# Patient Record
Sex: FEMALE | Race: OTHER | HISPANIC OR LATINO | URBAN - METROPOLITAN AREA
[De-identification: names, ages, dates, MRNs, and addresses within clinical notes are randomized per-mention and may not be internally consistent; named-entity substitution may affect disease eponyms.]

---

## 2017-05-25 ENCOUNTER — OUTPATIENT (OUTPATIENT)
Dept: OUTPATIENT SERVICES | Facility: HOSPITAL | Age: 27
LOS: 1 days | Discharge: HOME | End: 2017-05-25

## 2017-06-28 DIAGNOSIS — Z02.9 ENCOUNTER FOR ADMINISTRATIVE EXAMINATIONS, UNSPECIFIED: ICD-10-CM

## 2019-05-14 PROBLEM — Z00.00 ENCOUNTER FOR PREVENTIVE HEALTH EXAMINATION: Status: ACTIVE | Noted: 2019-05-14

## 2019-06-06 ENCOUNTER — RECORD ABSTRACTING (OUTPATIENT)
Age: 29
End: 2019-06-06

## 2019-06-06 DIAGNOSIS — Z78.9 OTHER SPECIFIED HEALTH STATUS: ICD-10-CM

## 2019-07-08 ENCOUNTER — APPOINTMENT (OUTPATIENT)
Dept: NEUROLOGY | Facility: CLINIC | Age: 29
End: 2019-07-08
Payer: COMMERCIAL

## 2019-07-08 VITALS
DIASTOLIC BLOOD PRESSURE: 69 MMHG | SYSTOLIC BLOOD PRESSURE: 108 MMHG | TEMPERATURE: 97.5 F | HEART RATE: 74 BPM | HEIGHT: 61 IN | OXYGEN SATURATION: 100 % | BODY MASS INDEX: 26.43 KG/M2 | WEIGHT: 140 LBS

## 2019-07-08 DIAGNOSIS — Z87.898 PERSONAL HISTORY OF OTHER SPECIFIED CONDITIONS: ICD-10-CM

## 2019-07-08 PROCEDURE — 99214 OFFICE O/P EST MOD 30 MIN: CPT

## 2019-07-08 NOTE — PHYSICAL EXAM
[FreeTextEntry1] : She is in a very good mood follows first step commands crosses the midline well her attention and concentration is normal and mental status is normal.\par She does have a tendency to tilt her head to the right as she is seeking. When in standing she also notes more weight on the right.\par Otherwise her exam including cranial nerves motor and sensory and cerebellar exam are normal

## 2019-07-08 NOTE — HISTORY OF PRESENT ILLNESS
[FreeTextEntry1] : Kayla is here for follow up. 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 She is following up with her primary physician she describes her mood and his sleep to be very good and she is also doing very well at her job.

## 2019-07-08 NOTE — ASSESSMENT
[FreeTextEntry1] : Kayla is here for follow up.\par She carries the diagnosis of primary headache doing very well on the current doses of prophylaxis and also abortive treatment she had to stop taking her medication because she wants to get pregnant. I have told her that propranolol could be safe during the pregnancy however the decision has been and is to not take any prophylaxis we will continue with the current level and I will see her in 6 months she will call me if there are any shortness in between

## 2020-01-13 ENCOUNTER — APPOINTMENT (OUTPATIENT)
Dept: NEUROLOGY | Facility: CLINIC | Age: 30
End: 2020-01-13

## 2021-02-24 ENCOUNTER — APPOINTMENT (OUTPATIENT)
Dept: NEUROLOGY | Facility: CLINIC | Age: 31
End: 2021-02-24
Payer: MEDICAID

## 2021-02-24 PROCEDURE — 99213 OFFICE O/P EST LOW 20 MIN: CPT | Mod: 95

## 2021-02-24 NOTE — HISTORY OF PRESENT ILLNESS
[Home] : at home, [unfilled] , at the time of the visit. [Medical Office: ([facility])___] : at the medical office located in  [Verbal consent obtained from patient] : the patient, [unfilled] [FreeTextEntry1] : Kayla  wanted this F/U as  a telehealth visit. This is because of having frequent HA and also Low back pain.\par The frequency of the HA are as many as 15/month.she has nausea and photophobia with it . The Ha are throbbing in nature.\par she says even in couple of occasions she had difficulty staying at work which is uncharacteristic of her.\par She says she takes pain medication  Naproxin 2 tabs a day for the HA and also mainly LBP.\par the pain in the back is occasionally so bad that she cannot stand on her feet\par The pain at times radiates to the left leg.No report of weakness . No Bladder issues\par No falls.still not pregnant but trying.\par No other issues\par \par Plan\par will do MRI\par F/u in the office for repeating PE\par Decide on prophylaxis ? ( propranolol)

## 2021-03-12 ENCOUNTER — OUTPATIENT (OUTPATIENT)
Dept: OUTPATIENT SERVICES | Facility: HOSPITAL | Age: 31
LOS: 1 days | Discharge: HOME | End: 2021-03-12
Payer: MEDICAID

## 2021-03-12 ENCOUNTER — RESULT REVIEW (OUTPATIENT)
Age: 31
End: 2021-03-12

## 2021-03-12 DIAGNOSIS — M54.5 LOW BACK PAIN: ICD-10-CM

## 2021-03-12 PROCEDURE — 72148 MRI LUMBAR SPINE W/O DYE: CPT | Mod: 26

## 2021-03-24 ENCOUNTER — APPOINTMENT (OUTPATIENT)
Dept: NEUROLOGY | Facility: CLINIC | Age: 31
End: 2021-03-24
Payer: MEDICAID

## 2021-03-24 VITALS
HEART RATE: 82 BPM | WEIGHT: 145 LBS | BODY MASS INDEX: 27.38 KG/M2 | HEIGHT: 61 IN | DIASTOLIC BLOOD PRESSURE: 60 MMHG | TEMPERATURE: 96.7 F | OXYGEN SATURATION: 98 % | SYSTOLIC BLOOD PRESSURE: 110 MMHG

## 2021-03-24 PROCEDURE — 99072 ADDL SUPL MATRL&STAF TM PHE: CPT

## 2021-03-24 PROCEDURE — 99214 OFFICE O/P EST MOD 30 MIN: CPT

## 2021-03-25 ENCOUNTER — RX CHANGE (OUTPATIENT)
Age: 31
End: 2021-03-25

## 2021-03-25 RX ORDER — PROPRANOLOL HYDROCHLORIDE 10 MG/1
10 TABLET ORAL
Qty: 30 | Refills: 3 | Status: DISCONTINUED | COMMUNITY
Start: 2021-03-25 | End: 2021-03-25

## 2021-03-25 NOTE — PHYSICAL EXAM
[FreeTextEntry1] : A/A/Ox3\par follows 4 step commands\par good attention\par good mood\par slightly tilts to the left when sitting and dropping the left shoulder as standing\par slight increase muscle bulk right\par no drift\par 5/5 strength\par normal tone\par able to walk on tip toes and the heels\par stable gait

## 2021-03-25 NOTE — ASSESSMENT
[FreeTextEntry1] : 1- primary HA doing well\par 2-LBP  + L 4-5 radiculopathy\par \par Plan\par GBP  100 bid\par Robaxin\par PT and EMG\par discussed extensively the ergonomics, weight, posture

## 2021-03-25 NOTE — HISTORY OF PRESENT ILLNESS
[FreeTextEntry1] : Kasey is here for the F/U.\par She is not complaing of HA but holding her back on the right side because of the pain.\par She says pain is really bothering her since last few days . Perhaps exacerbated by sitting down and mopping the floor.\par she says the pain at times radiates down to the legs and mainly left .The distribution of the ache is back of the calf, lateral aspect of the thigh.\par No clear weakness\par No falls\par No change in the bladder and bowel function\par MRI was done \par the results were discussed \par it shows  L4-5 disc disease and foraminal narrowing

## 2021-06-16 ENCOUNTER — APPOINTMENT (OUTPATIENT)
Dept: NEUROLOGY | Facility: CLINIC | Age: 31
End: 2021-06-16

## 2021-07-27 ENCOUNTER — APPOINTMENT (OUTPATIENT)
Dept: NEUROLOGY | Facility: CLINIC | Age: 31
End: 2021-07-27

## 2022-01-24 ENCOUNTER — RX RENEWAL (OUTPATIENT)
Age: 32
End: 2022-01-24

## 2022-06-20 ENCOUNTER — APPOINTMENT (OUTPATIENT)
Dept: OBGYN | Facility: CLINIC | Age: 32
End: 2022-06-20

## 2022-08-08 ENCOUNTER — APPOINTMENT (OUTPATIENT)
Dept: OBGYN | Facility: CLINIC | Age: 32
End: 2022-08-08

## 2022-08-08 ENCOUNTER — TRANSCRIPTION ENCOUNTER (OUTPATIENT)
Age: 32
End: 2022-08-08

## 2022-08-08 ENCOUNTER — NON-APPOINTMENT (OUTPATIENT)
Age: 32
End: 2022-08-08

## 2022-08-08 VITALS
DIASTOLIC BLOOD PRESSURE: 69 MMHG | HEIGHT: 61 IN | HEART RATE: 54 BPM | BODY MASS INDEX: 26.62 KG/M2 | SYSTOLIC BLOOD PRESSURE: 109 MMHG | WEIGHT: 141 LBS

## 2022-08-08 DIAGNOSIS — N83.292 OTHER OVARIAN CYST, LEFT SIDE: ICD-10-CM

## 2022-08-08 PROCEDURE — 99203 OFFICE O/P NEW LOW 30 MIN: CPT | Mod: 25

## 2022-08-08 PROCEDURE — 76830 TRANSVAGINAL US NON-OB: CPT

## 2022-08-08 NOTE — DISCUSSION/SUMMARY
[FreeTextEntry1] : reviewed blood work , no indication of PCOS\par follow up in jan 2023 for WWV\par pt doesn’t need OCP renewal at this time, has enough to last her until jan

## 2022-08-08 NOTE — PHYSICAL EXAM
[Chaperone Present] : A chaperone was present in the examining room during all aspects of the physical examination [Appropriately responsive] : appropriately responsive [Alert] : alert [No Acute Distress] : no acute distress [Oriented x3] : oriented x3 [Labia Majora] : normal [Labia Minora] : normal [Scant] : There was scant vaginal bleeding [Normal] : normal [Uterine Adnexae] : normal

## 2022-08-08 NOTE — HISTORY OF PRESENT ILLNESS
[Oral Contraceptive] : uses oral contraception pills [Y] : Patient is sexually active [Monogamous (Male Partner)] : is monogamous with a male partner [N] : Patient denies prior pregnancies [LMPDate] : 8/2/22 [MensesFreq] : 28 [MensesLength] : 5 [MensesAmount] : mod [Regular Cycle Intervals] : periods have been regular [FreeTextEntry1] : 8/2/22 [Currently Active] : currently active [No] : No

## 2022-08-08 NOTE — PROCEDURE
[Suspected Ovarian Cyst] : suspected ovarian cyst [F/U Abnormal US] : f/u abnormal ultrasound [Transvaginal Ultrasound] : transvaginal ultrasound [Anteverted] : anteverted [FreeTextEntry5] : 39.09cc [FreeTextEntry7] : 6.31cc [FreeTextEntry8] : 6.06cc [FreeTextEntry4] : no evidence of PCOS

## 2022-11-21 ENCOUNTER — NON-APPOINTMENT (OUTPATIENT)
Age: 32
End: 2022-11-21

## 2022-12-27 ENCOUNTER — APPOINTMENT (OUTPATIENT)
Dept: NEUROLOGY | Facility: CLINIC | Age: 32
End: 2022-12-27

## 2022-12-27 VITALS
TEMPERATURE: 97.9 F | WEIGHT: 140 LBS | HEART RATE: 69 BPM | BODY MASS INDEX: 26.43 KG/M2 | SYSTOLIC BLOOD PRESSURE: 118 MMHG | OXYGEN SATURATION: 98 % | HEIGHT: 61 IN | DIASTOLIC BLOOD PRESSURE: 67 MMHG

## 2022-12-27 PROCEDURE — 99214 OFFICE O/P EST MOD 30 MIN: CPT

## 2022-12-27 RX ORDER — GABAPENTIN 100 MG/1
100 CAPSULE ORAL
Qty: 60 | Refills: 2 | Status: DISCONTINUED | COMMUNITY
Start: 2021-03-24 | End: 2022-12-27

## 2022-12-27 RX ORDER — METHOCARBAMOL 500 MG/1
500 TABLET, FILM COATED ORAL
Qty: 60 | Refills: 0 | Status: DISCONTINUED | COMMUNITY
Start: 2021-03-24 | End: 2022-12-27

## 2023-01-18 NOTE — HISTORY OF PRESENT ILLNESS
[FreeTextEntry1] : Kayla is here for the F/U. she appears to be slightly down \par She says , she is not sleeping well and is anxious and also depressed > according to her it started after she lost her father.\par Her PMD had prescribed her Xanax PRN and also Sertraline.\par She says she is also having about 10 HA/month. Not keeping her from going to work and often responding well to the Tx but the headache is atypical. 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

## 2023-01-18 NOTE — PHYSICAL EXAM
[FreeTextEntry1] : A/A / Ox 3\par follows 4 step commands slightly looking tired and flat and less of humor\par normal funduscopy\par normal CN\par no drift\par no dysmetria\par stable gait\par

## 2023-01-18 NOTE — ASSESSMENT
[FreeTextEntry1] : HA ( primary)\par LBP\par Mood D/O\par \par \par plan\par MRI of the brain due to atypical headache\par F/U

## 2023-01-24 ENCOUNTER — NON-APPOINTMENT (OUTPATIENT)
Age: 33
End: 2023-01-24

## 2023-01-25 ENCOUNTER — APPOINTMENT (OUTPATIENT)
Dept: NEUROLOGY | Facility: CLINIC | Age: 33
End: 2023-01-25
Payer: COMMERCIAL

## 2023-01-25 PROCEDURE — 95816 EEG AWAKE AND DROWSY: CPT

## 2023-02-17 ENCOUNTER — OUTPATIENT (OUTPATIENT)
Dept: OUTPATIENT SERVICES | Facility: HOSPITAL | Age: 33
LOS: 1 days | End: 2023-02-17
Payer: MEDICAID

## 2023-02-17 ENCOUNTER — RESULT REVIEW (OUTPATIENT)
Age: 33
End: 2023-02-17

## 2023-02-17 DIAGNOSIS — R51.9 HEADACHE, UNSPECIFIED: ICD-10-CM

## 2023-02-17 DIAGNOSIS — Z00.8 ENCOUNTER FOR OTHER GENERAL EXAMINATION: ICD-10-CM

## 2023-02-17 PROCEDURE — 70551 MRI BRAIN STEM W/O DYE: CPT

## 2023-02-17 PROCEDURE — 70551 MRI BRAIN STEM W/O DYE: CPT | Mod: 26

## 2023-02-18 DIAGNOSIS — R51.9 HEADACHE, UNSPECIFIED: ICD-10-CM

## 2023-03-12 ENCOUNTER — LABORATORY RESULT (OUTPATIENT)
Age: 33
End: 2023-03-12

## 2023-03-13 ENCOUNTER — APPOINTMENT (OUTPATIENT)
Dept: OBGYN | Facility: CLINIC | Age: 33
End: 2023-03-13
Payer: COMMERCIAL

## 2023-03-13 VITALS
SYSTOLIC BLOOD PRESSURE: 106 MMHG | WEIGHT: 145 LBS | DIASTOLIC BLOOD PRESSURE: 72 MMHG | BODY MASS INDEX: 27.38 KG/M2 | HEART RATE: 85 BPM | HEIGHT: 61 IN

## 2023-03-13 DIAGNOSIS — N92.6 IRREGULAR MENSTRUATION, UNSPECIFIED: ICD-10-CM

## 2023-03-13 DIAGNOSIS — Z30.41 ENCOUNTER FOR SURVEILLANCE OF CONTRACEPTIVE PILLS: ICD-10-CM

## 2023-03-13 DIAGNOSIS — Z01.411 ENCOUNTER FOR GYNECOLOGICAL EXAMINATION (GENERAL) (ROUTINE) WITH ABNORMAL FINDINGS: ICD-10-CM

## 2023-03-13 DIAGNOSIS — N89.8 OTHER SPECIFIED NONINFLAMMATORY DISORDERS OF VAGINA: ICD-10-CM

## 2023-03-13 PROCEDURE — 76830 TRANSVAGINAL US NON-OB: CPT

## 2023-03-13 PROCEDURE — 99395 PREV VISIT EST AGE 18-39: CPT | Mod: 25

## 2023-03-13 NOTE — DISCUSSION/SUMMARY
[FreeTextEntry1] : pt only had 1 episode of irregular bleeding while she had COVID\par she has been on this pill for more than 2 yrs without any adverse side effects\par recommended remaining  on current pill, if any bleeding irregularities occur then she  should call office.

## 2023-03-13 NOTE — HISTORY OF PRESENT ILLNESS
[FreeTextEntry1] : annual vivit\par renewal of COCs, taking pill for pregnancy prevention and cyst suppression\par had irregular cycle, pt states she bled the entire month, has hx of ovarian cysts\par 1st episode like this, had COVID at the time [Oral Contraceptive] : uses oral contraception pills [Y] : Patient is sexually active [Monogamous (Male Partner)] : is monogamous with a male partner [N] : Patient denies prior pregnancies [LMPDate] : 8/2/22 [MensesFreq] : 28 [MensesLength] : 5 [MensesAmount] : mod [Regular Cycle Intervals] : periods have been regular [Currently Active] : currently active [No] : No

## 2023-03-13 NOTE — PHYSICAL EXAM
[Chaperone Present] : A chaperone was present in the examining room during all aspects of the physical examination [Appropriately responsive] : appropriately responsive [Alert] : alert [No Acute Distress] : no acute distress [Soft] : soft [Non-tender] : non-tender [Non-distended] : non-distended [Oriented x3] : oriented x3 [Examination Of The Breasts] : a normal appearance [No Masses] : no breast masses were palpable [Labia Majora] : normal [Labia Minora] : normal [Discharge] : a  ~M vaginal discharge was present [Heavy] : heavy [White] : white [Frothy] : frothy [Normal] : normal [Uterine Adnexae] : normal

## 2023-03-15 LAB — CYTOLOGY CVX/VAG DOC THIN PREP: NORMAL

## 2023-03-16 ENCOUNTER — NON-APPOINTMENT (OUTPATIENT)
Age: 33
End: 2023-03-16

## 2023-03-16 DIAGNOSIS — B96.89 ACUTE VAGINITIS: ICD-10-CM

## 2023-03-16 DIAGNOSIS — N76.0 ACUTE VAGINITIS: ICD-10-CM

## 2023-03-16 LAB
A VAGINAE DNA VAG QL NAA+PROBE: ABNORMAL
BVAB2 DNA VAG QL NAA+PROBE: ABNORMAL
C KRUSEI DNA VAG QL NAA+PROBE: NEGATIVE
C TRACH RRNA SPEC QL NAA+PROBE: NEGATIVE
HPV HIGH+LOW RISK DNA PNL CVX: DETECTED
MEGA1 DNA VAG QL NAA+PROBE: ABNORMAL
N GONORRHOEA RRNA SPEC QL NAA+PROBE: NEGATIVE
T VAGINALIS RRNA SPEC QL NAA+PROBE: NEGATIVE

## 2023-04-13 ENCOUNTER — NON-APPOINTMENT (OUTPATIENT)
Age: 33
End: 2023-04-13

## 2023-04-17 ENCOUNTER — RX RENEWAL (OUTPATIENT)
Age: 33
End: 2023-04-17

## 2023-04-17 RX ORDER — RIZATRIPTAN BENZOATE 5 MG/1
5 TABLET ORAL
Qty: 30 | Refills: 2 | Status: ACTIVE | COMMUNITY
Start: 1900-01-01 | End: 1900-01-01

## 2023-05-26 ENCOUNTER — RX CHANGE (OUTPATIENT)
Age: 33
End: 2023-05-26

## 2023-05-26 RX ORDER — TOPIRAMATE 25 MG/1
25 TABLET, FILM COATED ORAL AT BEDTIME
Qty: 90 | Refills: 1 | Status: ACTIVE | COMMUNITY
Start: 2023-04-27 | End: 1900-01-01

## 2023-06-26 ENCOUNTER — APPOINTMENT (OUTPATIENT)
Dept: NEUROLOGY | Facility: CLINIC | Age: 33
End: 2023-06-26

## 2023-10-09 ENCOUNTER — TRANSCRIPTION ENCOUNTER (OUTPATIENT)
Age: 33
End: 2023-10-09

## 2023-10-10 ENCOUNTER — NON-APPOINTMENT (OUTPATIENT)
Age: 33
End: 2023-10-10

## 2024-01-22 ENCOUNTER — TRANSCRIPTION ENCOUNTER (OUTPATIENT)
Age: 34
End: 2024-01-22

## 2024-03-18 ENCOUNTER — APPOINTMENT (OUTPATIENT)
Dept: OBGYN | Facility: CLINIC | Age: 34
End: 2024-03-18

## 2024-03-25 ENCOUNTER — APPOINTMENT (OUTPATIENT)
Dept: NEUROLOGY | Facility: CLINIC | Age: 34
End: 2024-03-25
Payer: COMMERCIAL

## 2024-03-25 VITALS — DIASTOLIC BLOOD PRESSURE: 63 MMHG | SYSTOLIC BLOOD PRESSURE: 115 MMHG | HEART RATE: 65 BPM

## 2024-03-25 VITALS
SYSTOLIC BLOOD PRESSURE: 117 MMHG | TEMPERATURE: 98.5 F | OXYGEN SATURATION: 100 % | HEIGHT: 61 IN | WEIGHT: 144 LBS | BODY MASS INDEX: 27.19 KG/M2 | DIASTOLIC BLOOD PRESSURE: 75 MMHG | HEART RATE: 65 BPM

## 2024-03-25 VITALS — HEART RATE: 63 BPM | SYSTOLIC BLOOD PRESSURE: 109 MMHG | DIASTOLIC BLOOD PRESSURE: 66 MMHG

## 2024-03-25 VITALS — HEART RATE: 70 BPM | DIASTOLIC BLOOD PRESSURE: 65 MMHG | SYSTOLIC BLOOD PRESSURE: 116 MMHG

## 2024-03-25 DIAGNOSIS — R51.9 HEADACHE, UNSPECIFIED: ICD-10-CM

## 2024-03-25 DIAGNOSIS — M54.50 LOW BACK PAIN, UNSPECIFIED: ICD-10-CM

## 2024-03-25 PROCEDURE — 99213 OFFICE O/P EST LOW 20 MIN: CPT

## 2024-03-25 RX ORDER — PROPRANOLOL HYDROCHLORIDE 10 MG/1
10 TABLET ORAL
Qty: 15 | Refills: 4 | Status: ACTIVE | COMMUNITY
Start: 2021-03-25 | End: 1900-01-01

## 2024-03-26 NOTE — HISTORY OF PRESENT ILLNESS
[FreeTextEntry1] : Kayla is 33 year old female with PMH of back pain and headache presented for a follow up. She was last seen in 2022. She continues to have headache but stopped all medication because she is now 4 month pregnant. Has only been taking Tylenol prn(typically about 2 times per week). Pregnancy is going well she is under the care of Dr Birch. 2/17/23 she had an MRI of the brain which was unremarkable 1/25/23 REEG- normal

## 2024-03-26 NOTE — DISCUSSION/SUMMARY
[FreeTextEntry1] : Kayla is 33 year old female with PMH of back pain and headache presented for a follow up. Discussed at length medication that are safe to take during pregnancy. Pt agreed to try Propranolol 5 mg daily. Also discussed importance of staying hydrated and daily stretching. Pt will call if she develops any atypical headaches. Follow up in 4 month before delivery.  Case discussed with Dr. Kenneth Smiley, DNP, ACNP-BC

## 2024-03-26 NOTE — PHYSICAL EXAM
[General Appearance - In No Acute Distress] : in no acute distress [General Appearance - Alert] : alert [Impaired Insight] : insight and judgment were intact [Oriented To Time, Place, And Person] : oriented to person, place, and time [Person] : oriented to person [Place] : oriented to place [Time] : oriented to time [Cranial Nerves Optic (II)] : visual acuity intact bilaterally,  visual fields full to confrontation, pupils equal round and reactive to light [Cranial Nerves Oculomotor (III)] : extraocular motion intact [Cranial Nerves Trigeminal (V)] : facial sensation intact symmetrically [Cranial Nerves Vestibulocochlear (VIII)] : hearing was intact bilaterally [Cranial Nerves Facial (VII)] : face symmetrical [Cranial Nerves Accessory (XI - Cranial And Spinal)] : head turning and shoulder shrug symmetric [Cranial Nerves Glossopharyngeal (IX)] : tongue and palate midline [Cranial Nerves Hypoglossal (XII)] : there was no tongue deviation with protrusion [No Muscle Atrophy] : normal bulk in all four extremities [Paresis Pronator Drift Right-Sided] : no pronator drift on the right [Paresis Pronator Drift Left-Sided] : no pronator drift on the left [Motor Strength Upper Extremities Bilaterally] : strength was normal in both upper extremities [Limited Balance] : balance was intact [Motor Strength Lower Extremities Bilaterally] : strength was normal in both lower extremities [Past-pointing] : there was no past-pointing [Tremor] : no tremor present

## 2024-06-12 ENCOUNTER — EMERGENCY (EMERGENCY)
Facility: HOSPITAL | Age: 34
LOS: 0 days | Discharge: HOME | End: 2024-06-12
Attending: EMERGENCY MEDICINE
Payer: COMMERCIAL

## 2024-06-12 VITALS
SYSTOLIC BLOOD PRESSURE: 119 MMHG | HEIGHT: 61 IN | OXYGEN SATURATION: 99 % | HEART RATE: 77 BPM | WEIGHT: 160.06 LBS | RESPIRATION RATE: 17 BRPM | DIASTOLIC BLOOD PRESSURE: 66 MMHG

## 2024-06-12 VITALS
DIASTOLIC BLOOD PRESSURE: 58 MMHG | SYSTOLIC BLOOD PRESSURE: 103 MMHG | HEART RATE: 80 BPM | RESPIRATION RATE: 18 BRPM | OXYGEN SATURATION: 98 % | TEMPERATURE: 98 F

## 2024-06-12 DIAGNOSIS — R55 SYNCOPE AND COLLAPSE: ICD-10-CM

## 2024-06-12 DIAGNOSIS — Z3A.24 24 WEEKS GESTATION OF PREGNANCY: ICD-10-CM

## 2024-06-12 DIAGNOSIS — O99.891 OTHER SPECIFIED DISEASES AND CONDITIONS COMPLICATING PREGNANCY: ICD-10-CM

## 2024-06-12 LAB
ALBUMIN SERPL ELPH-MCNC: 4.1 G/DL — SIGNIFICANT CHANGE UP (ref 3.5–5.2)
ALP SERPL-CCNC: 49 U/L — SIGNIFICANT CHANGE UP (ref 30–115)
ALT FLD-CCNC: 20 U/L — SIGNIFICANT CHANGE UP (ref 0–41)
ANION GAP SERPL CALC-SCNC: 3 MMOL/L — LOW (ref 7–14)
APPEARANCE UR: ABNORMAL
AST SERPL-CCNC: 23 U/L — SIGNIFICANT CHANGE UP (ref 0–41)
BACTERIA # UR AUTO: ABNORMAL /HPF
BASOPHILS # BLD AUTO: 0.05 K/UL — SIGNIFICANT CHANGE UP (ref 0–0.2)
BASOPHILS NFR BLD AUTO: 0.4 % — SIGNIFICANT CHANGE UP (ref 0–1)
BILIRUB SERPL-MCNC: <0.2 MG/DL — SIGNIFICANT CHANGE UP (ref 0.2–1.2)
BILIRUB UR-MCNC: NEGATIVE — SIGNIFICANT CHANGE UP
BLD GP AB SCN SERPL QL: SIGNIFICANT CHANGE UP
BUN SERPL-MCNC: 7 MG/DL — LOW (ref 10–20)
CALCIUM SERPL-MCNC: 9.2 MG/DL — SIGNIFICANT CHANGE UP (ref 8.4–10.5)
CAST: 1 /LPF — SIGNIFICANT CHANGE UP (ref 0–4)
CHLORIDE SERPL-SCNC: 104 MMOL/L — SIGNIFICANT CHANGE UP (ref 98–110)
CO2 SERPL-SCNC: 20 MMOL/L — SIGNIFICANT CHANGE UP (ref 17–32)
COLOR SPEC: YELLOW — SIGNIFICANT CHANGE UP
CREAT SERPL-MCNC: <0.5 MG/DL — LOW (ref 0.7–1.5)
DIFF PNL FLD: NEGATIVE — SIGNIFICANT CHANGE UP
EGFR: 134 ML/MIN/1.73M2 — SIGNIFICANT CHANGE UP
EOSINOPHIL # BLD AUTO: 0.18 K/UL — SIGNIFICANT CHANGE UP (ref 0–0.7)
EOSINOPHIL NFR BLD AUTO: 1.5 % — SIGNIFICANT CHANGE UP (ref 0–8)
GLUCOSE SERPL-MCNC: 85 MG/DL — SIGNIFICANT CHANGE UP (ref 70–99)
GLUCOSE UR QL: NEGATIVE MG/DL — SIGNIFICANT CHANGE UP
HCG SERPL-ACNC: 3694 MIU/ML — HIGH
HCT VFR BLD CALC: 33 % — LOW (ref 37–47)
HGB BLD-MCNC: 11 G/DL — LOW (ref 12–16)
IMM GRANULOCYTES NFR BLD AUTO: 1.9 % — HIGH (ref 0.1–0.3)
KETONES UR-MCNC: NEGATIVE MG/DL — SIGNIFICANT CHANGE UP
LEUKOCYTE ESTERASE UR-ACNC: ABNORMAL
LIDOCAIN IGE QN: 16 U/L — SIGNIFICANT CHANGE UP (ref 7–60)
LYMPHOCYTES # BLD AUTO: 1.91 K/UL — SIGNIFICANT CHANGE UP (ref 1.2–3.4)
LYMPHOCYTES # BLD AUTO: 16.1 % — LOW (ref 20.5–51.1)
MCHC RBC-ENTMCNC: 31.8 PG — HIGH (ref 27–31)
MCHC RBC-ENTMCNC: 33.3 G/DL — SIGNIFICANT CHANGE UP (ref 32–37)
MCV RBC AUTO: 95.4 FL — SIGNIFICANT CHANGE UP (ref 81–99)
MONOCYTES # BLD AUTO: 1.37 K/UL — HIGH (ref 0.1–0.6)
MONOCYTES NFR BLD AUTO: 11.6 % — HIGH (ref 1.7–9.3)
NEUTROPHILS # BLD AUTO: 8.1 K/UL — HIGH (ref 1.4–6.5)
NEUTROPHILS NFR BLD AUTO: 68.5 % — SIGNIFICANT CHANGE UP (ref 42.2–75.2)
NITRITE UR-MCNC: NEGATIVE — SIGNIFICANT CHANGE UP
NRBC # BLD: 0 /100 WBCS — SIGNIFICANT CHANGE UP (ref 0–0)
PH UR: 7 — SIGNIFICANT CHANGE UP (ref 5–8)
PLATELET # BLD AUTO: 215 K/UL — SIGNIFICANT CHANGE UP (ref 130–400)
PMV BLD: 10.2 FL — SIGNIFICANT CHANGE UP (ref 7.4–10.4)
POTASSIUM SERPL-MCNC: 4.1 MMOL/L — SIGNIFICANT CHANGE UP (ref 3.5–5)
POTASSIUM SERPL-SCNC: 4.1 MMOL/L — SIGNIFICANT CHANGE UP (ref 3.5–5)
PROT SERPL-MCNC: 6.2 G/DL — SIGNIFICANT CHANGE UP (ref 6–8)
PROT UR-MCNC: SIGNIFICANT CHANGE UP MG/DL
RBC # BLD: 3.46 M/UL — LOW (ref 4.2–5.4)
RBC # FLD: 14 % — SIGNIFICANT CHANGE UP (ref 11.5–14.5)
RBC CASTS # UR COMP ASSIST: 9 /HPF — HIGH (ref 0–4)
SODIUM SERPL-SCNC: 127 MMOL/L — LOW (ref 135–146)
SP GR SPEC: 1.01 — SIGNIFICANT CHANGE UP (ref 1–1.03)
SQUAMOUS # UR AUTO: 32 /HPF — HIGH (ref 0–5)
UROBILINOGEN FLD QL: 0.2 MG/DL — SIGNIFICANT CHANGE UP (ref 0.2–1)
WBC # BLD: 11.84 K/UL — HIGH (ref 4.8–10.8)
WBC # FLD AUTO: 11.84 K/UL — HIGH (ref 4.8–10.8)
WBC UR QL: 10 /HPF — HIGH (ref 0–5)

## 2024-06-12 PROCEDURE — 86901 BLOOD TYPING SEROLOGIC RH(D): CPT

## 2024-06-12 PROCEDURE — 85025 COMPLETE CBC W/AUTO DIFF WBC: CPT

## 2024-06-12 PROCEDURE — 82962 GLUCOSE BLOOD TEST: CPT

## 2024-06-12 PROCEDURE — 36415 COLL VENOUS BLD VENIPUNCTURE: CPT

## 2024-06-12 PROCEDURE — 93308 TTE F-UP OR LMTD: CPT

## 2024-06-12 PROCEDURE — 86850 RBC ANTIBODY SCREEN: CPT

## 2024-06-12 PROCEDURE — 99284 EMERGENCY DEPT VISIT MOD MDM: CPT

## 2024-06-12 PROCEDURE — 76815 OB US LIMITED FETUS(S): CPT

## 2024-06-12 PROCEDURE — 86900 BLOOD TYPING SEROLOGIC ABO: CPT

## 2024-06-12 PROCEDURE — 93005 ELECTROCARDIOGRAM TRACING: CPT

## 2024-06-12 PROCEDURE — 93308 TTE F-UP OR LMTD: CPT | Mod: 26

## 2024-06-12 PROCEDURE — 99285 EMERGENCY DEPT VISIT HI MDM: CPT | Mod: 25

## 2024-06-12 PROCEDURE — 93010 ELECTROCARDIOGRAM REPORT: CPT | Mod: 77

## 2024-06-12 PROCEDURE — 93010 ELECTROCARDIOGRAM REPORT: CPT

## 2024-06-12 PROCEDURE — 76815 OB US LIMITED FETUS(S): CPT | Mod: 26

## 2024-06-12 PROCEDURE — 83690 ASSAY OF LIPASE: CPT

## 2024-06-12 PROCEDURE — 80053 COMPREHEN METABOLIC PANEL: CPT

## 2024-06-12 PROCEDURE — 81001 URINALYSIS AUTO W/SCOPE: CPT

## 2024-06-12 PROCEDURE — 84702 CHORIONIC GONADOTROPIN TEST: CPT

## 2024-06-12 RX ORDER — SODIUM CHLORIDE 9 MG/ML
1000 INJECTION INTRAMUSCULAR; INTRAVENOUS; SUBCUTANEOUS ONCE
Refills: 0 | Status: COMPLETED | OUTPATIENT
Start: 2024-06-12 | End: 2024-06-12

## 2024-06-12 RX ADMIN — SODIUM CHLORIDE 1000 MILLILITER(S): 9 INJECTION INTRAMUSCULAR; INTRAVENOUS; SUBCUTANEOUS at 11:10

## 2024-06-12 NOTE — ED PROVIDER NOTE - RHYTHM STRIP/ULTRASOUND IMAGES/CT SCAN IMAGES SHOWED
Independent interpretation of the US as a preliminary reading by Dr. Gab Klein shows Independent interpretation of the US as a preliminary reading by Dr. Gab Klein shows nml fhr of 144

## 2024-06-12 NOTE — ED PROVIDER NOTE - OBJECTIVE STATEMENT
34-year-old female G1, , 24 weeks pregnant, and with no past medical history who presents to the ED after syncope episode.  Reports that she was at work and fell dizzy and passed out for a few seconds.  Reports that her coworker was able to catch her, so she did not sustain any trauma.  Denies headache, chest pain, shortness of breath, nausea, vomiting, abdominal pain, vaginal bleeding/discharge, and hematuria/dysuria.

## 2024-06-12 NOTE — ED PROVIDER NOTE - PHYSICAL EXAMINATION
CONSTITUTIONAL: in no apparent distress.   HEAD: Normocephalic; atraumatic.   EYES: Pupils are round and reactive, extra-ocular muscles are intact. Eyelids are normal in appearance without swelling or lesions.   ENT: Hearing is intact with good acuity to spoken voice.  Patient is speaking clearly, not muffled and airway is intact.   RESPIRATORY: No signs of respiratory distress. Lung sounds are clear in all lobes bilaterally without rales, rhonchi, or wheezes.  CARDIOVASCULAR: Regular rate and rhythm.   GI: Abdomen is soft, non-tender, and without distention. Bowel sounds are present and normoactive in all four quadrants. No masses are noted.   BACK: No evidence of trauma or deformity. No CVA tenderness bilaterally. Normal ROM.   NEURO: A & O x 3. Normal speech. No focal deficit.  PSYCHOLOGICAL: Appropriate mood and affect. Good judgement and insight.

## 2024-06-12 NOTE — ED ADULT NURSE NOTE - NSFALLUNIVINTERV_ED_ALL_ED
Bed/Stretcher in lowest position, wheels locked, appropriate side rails in place/Call bell, personal items and telephone in reach/Instruct patient to call for assistance before getting out of bed/chair/stretcher/Non-slip footwear applied when patient is off stretcher/[place] to call system/Physically safe environment - no spills, clutter or unnecessary equipment/Purposeful proactive rounding/Room/bathroom lighting operational, light cord in reach

## 2024-06-12 NOTE — ED PROVIDER NOTE - NSFOLLOWUPINSTRUCTIONS_ED_ALL_ED_FT
Please make sure to follow up with your primary care doctor in 3 days.    Please make sure to follow up with your obstetrician in 3 days.      You were noted to have what is called, a syncopal episode, which is an event when you temporarily lose consciousness. These events happen for a variety of reasons and you were kept in the hospital to evaluate what the cause of your event was. Thankfully, these events in themselves do not leave any long lasting effects on your body, however, they may happen again if the cause of the problem is not found and treated if need be. Many people never find out what caused their event. If you have been advised to follow up with any doctors, or specialists, please be sure to do so.

## 2024-06-12 NOTE — ED PROVIDER NOTE - NSDCPRINTRESULTS_ED_ALL_ED
Patient requests all Lab, Cardiology, and Radiology Results on their Discharge Instructions
no pain, swelling or deformity of joints

## 2024-06-12 NOTE — ED PROVIDER NOTE - CLINICAL SUMMARY MEDICAL DECISION MAKING FREE TEXT BOX
evaluated for syncope, lab work was nml. bedside echo nml. fetal us with nml fhr. ua reviewed however patient has no symptoms and likely contaminated. Patient to be discharged from ED. Any available test results were discussed with patient and/or family. Verbal instructions given, including instructions to return to ED immediately for any new, worsening, or concerning symptoms. Patient endorsed understanding. Written discharge instructions additionally given, including follow-up plan.

## 2024-06-12 NOTE — ED PROVIDER NOTE - NS_EDPROVIDERDISPOUSERTYPE_ED_A_ED
Called patient and advised that provider is full for calendar year   Will call and charlie for 2024 when it is open   Ok for the  hub to read   Attending Attestation (For Attendings USE Only)...

## 2024-06-12 NOTE — ED ADULT NURSE NOTE - OBJECTIVE STATEMENT
34 yr old female, presenting to ED c/o syncope. Pt c/o syncopal episode at work. (+)LOC, denies (-)HT/AC use. Denies n/v/d/fevers/chills. Pt A&Ox4, ambulatory baseline.

## 2024-06-12 NOTE — ED PROVIDER NOTE - PATIENT PORTAL LINK FT
You can access the FollowMyHealth Patient Portal offered by [facility] by registering at the following website: [email or web address]. By joining [employer]’s FollowMyHealth portal, you will also be able to view your health information using other applications (apps) compatible with our system.

## 2024-06-12 NOTE — ED PROVIDER NOTE - STUDIES
EKG - see results section for interpretation EKG - see results section for interpretation/Ultrasound images

## 2024-06-12 NOTE — ED PROVIDER NOTE - EKG/XRAY ADDITIONAL INFORMATION
independent interpretation of the ekg performed by Dr. Gab Klein shows independent interpretation of the ekg performed by Dr. Gab Klein shows Normal sinus rhythm heart rate 78    130 QRS 70 QTc 444 no elevations or depressions

## 2024-06-12 NOTE — ED PROVIDER NOTE - PROGRESS NOTE DETAILS
Labs unremarkable.  Ultrasound normal.  Reports that her symptoms has already subsided.  Urine is contaminated and the patient denies any urinary symptoms.  Patient is stable for discharge.

## 2024-06-12 NOTE — ED PROVIDER NOTE - ATTENDING APP SHARED VISIT CONTRIBUTION OF CARE
34-year-old 25 weeks pregnant who is presenting with syncope while in a seated position she sat there for about 5 minutes started getting diaphoretic and warm with vision closing in she woke up in the same position she had a normal fingerstick she denies any chest pain headache or shortness of breath.  Denies any vaginal bleeding.  Last ultrasound was at 21 weeks and everything was normal.  On prenatals.  Plan is to obtain labs vitals IV fluids EKG and bedside ultrasound for fetal heart rate

## 2024-06-12 NOTE — ED ADULT TRIAGE NOTE - CHIEF COMPLAINT QUOTE
pt BIBEMS from work after having a syncopal episode and was caught by someone. pt is 24 weeks pregnant.

## 2024-07-31 ENCOUNTER — APPOINTMENT (OUTPATIENT)
Dept: NEUROLOGY | Facility: CLINIC | Age: 34
End: 2024-07-31
Payer: COMMERCIAL

## 2024-07-31 VITALS
RESPIRATION RATE: 18 BRPM | DIASTOLIC BLOOD PRESSURE: 64 MMHG | HEIGHT: 61 IN | WEIGHT: 173.31 LBS | HEART RATE: 79 BPM | OXYGEN SATURATION: 99 % | BODY MASS INDEX: 32.72 KG/M2 | SYSTOLIC BLOOD PRESSURE: 118 MMHG

## 2024-07-31 DIAGNOSIS — R51.9 HEADACHE, UNSPECIFIED: ICD-10-CM

## 2024-07-31 PROCEDURE — 99213 OFFICE O/P EST LOW 20 MIN: CPT

## 2024-08-01 NOTE — HISTORY OF PRESENT ILLNESS
[FreeTextEntry1] : Kayla is here for the F/U accompanied by her  She is now 32 weeks pregnant.  She says her LAWRENCE are really good  She is now off the Propranolol after having a near syncope / ? syncope event with low BP She denies having  spinal or joint pain. Goes to work every day The OB visits has been going very well As of now she is scheduled to have normal vag. delivery. No cervical pain She main have right hand dysesthesia and is wearing a soft splint

## 2024-08-01 NOTE — PHYSICAL EXAM
[FreeTextEntry1] : A/A/Ox3 good mood good attention Follows 4 step commands  CN- normal Good ROM of the neck No drift stable gait good posture negative Romberg

## 2024-08-01 NOTE — ASSESSMENT
[FreeTextEntry1] : Headache 32 weeks pregnant Hx of syncope/presyncope  No orthostatic hypotension today Plan   F/U PRN after birth she will call me if any issues

## 2025-05-28 ENCOUNTER — NON-APPOINTMENT (OUTPATIENT)
Age: 35
End: 2025-05-28

## 2025-05-29 ENCOUNTER — APPOINTMENT (OUTPATIENT)
Dept: NEUROLOGY | Facility: CLINIC | Age: 35
End: 2025-05-29
Payer: COMMERCIAL

## 2025-05-29 VITALS
SYSTOLIC BLOOD PRESSURE: 108 MMHG | BODY MASS INDEX: 29.07 KG/M2 | OXYGEN SATURATION: 98 % | TEMPERATURE: 97.8 F | DIASTOLIC BLOOD PRESSURE: 71 MMHG | HEIGHT: 61 IN | WEIGHT: 154 LBS | HEART RATE: 63 BPM

## 2025-05-29 DIAGNOSIS — R51.9 HEADACHE, UNSPECIFIED: ICD-10-CM

## 2025-05-29 PROCEDURE — 99213 OFFICE O/P EST LOW 20 MIN: CPT

## 2025-05-29 RX ORDER — SERTRALINE 25 MG/1
25 TABLET, FILM COATED ORAL
Refills: 0 | Status: ACTIVE | COMMUNITY
Start: 2025-05-29